# Patient Record
Sex: MALE | URBAN - METROPOLITAN AREA
[De-identification: names, ages, dates, MRNs, and addresses within clinical notes are randomized per-mention and may not be internally consistent; named-entity substitution may affect disease eponyms.]

---

## 2021-06-07 ENCOUNTER — OFFICE VISIT (OUTPATIENT)
Dept: URBAN - METROPOLITAN AREA CLINIC 83 | Facility: CLINIC | Age: 76
End: 2021-06-07
Payer: COMMERCIAL

## 2021-06-07 DIAGNOSIS — H25.13 AGE-RELATED NUCLEAR CATARACT, BILATERAL: ICD-10-CM

## 2021-06-07 DIAGNOSIS — H35.3122 NONEXUDATIVE AGE-RELATED MACULAR DEGENERATION, LEFT EYE, INTERMEDIATE DRY STAGE: ICD-10-CM

## 2021-06-07 DIAGNOSIS — H35.3211 EXUDATIVE AGE-RELATED MACULAR DEGENERATION, RIGHT EYE, WITH ACTIVE CHOROIDAL NEOVASCULARIZATION: Primary | ICD-10-CM

## 2021-06-07 PROCEDURE — 99204 OFFICE O/P NEW MOD 45 MIN: CPT | Performed by: OPHTHALMOLOGY

## 2021-06-07 PROCEDURE — 92134 CPTRZ OPH DX IMG PST SGM RTA: CPT | Performed by: OPHTHALMOLOGY

## 2021-06-07 ASSESSMENT — INTRAOCULAR PRESSURE
OS: 23
OD: 24

## 2021-06-07 NOTE — IMPRESSION/PLAN
Impression: Age-related nuclear cataract, bilateral: H25.13. Bilateral. Plan: --borderline VS OU, monitor

## 2021-06-07 NOTE — IMPRESSION/PLAN
Impression: Nonexudative age-related macular degeneration, left eye, intermediate dry stage: H35.3122 Left. Plan: --findings/dx discussed with pt in detail
--no evidence of CNV on exam/OCT
--AREDS-2 vitamins and Amsler grid self-monitoring reviewed
--dietary recs, avoid smoking, UV protection discussed --pt to call with s/s decreased vision/metamorphopsia
--continue q6 month monitoring w Dr. Mariam Gaytan

## 2021-06-07 NOTE — IMPRESSION/PLAN
Impression: Exudative age-related macular degeneration, right eye, with active choroidal neovascularization: H35.3211. Right.
s/p Avastin x 3 , last 2016 Plan: --exam confirms disciform scarring with adjacent SRH
--findings d/w pt in detail --unfortunately no available tx for subretinal scar from CNV 
--restarting anti-VEGF unlikely to help vision --pt elects to observe and cont [employer] grid monitoring RTC PRN